# Patient Record
Sex: FEMALE | URBAN - METROPOLITAN AREA
[De-identification: names, ages, dates, MRNs, and addresses within clinical notes are randomized per-mention and may not be internally consistent; named-entity substitution may affect disease eponyms.]

---

## 2022-06-01 ENCOUNTER — HOSPITAL ENCOUNTER (OUTPATIENT)
Dept: RADIOLOGY | Facility: MEDICAL CENTER | Age: 47
End: 2022-06-01

## 2022-06-01 ENCOUNTER — OFFICE VISIT (OUTPATIENT)
Dept: NEUROLOGY | Facility: MEDICAL CENTER | Age: 47
End: 2022-06-01
Attending: PSYCHIATRY & NEUROLOGY
Payer: COMMERCIAL

## 2022-06-01 VITALS
TEMPERATURE: 98.4 F | SYSTOLIC BLOOD PRESSURE: 124 MMHG | HEIGHT: 66 IN | DIASTOLIC BLOOD PRESSURE: 72 MMHG | RESPIRATION RATE: 14 BRPM | WEIGHT: 160.27 LBS | OXYGEN SATURATION: 99 % | BODY MASS INDEX: 25.76 KG/M2 | HEART RATE: 103 BPM

## 2022-06-01 DIAGNOSIS — R20.2 PARESTHESIA: ICD-10-CM

## 2022-06-01 DIAGNOSIS — M79.10 MUSCLE PAIN: ICD-10-CM

## 2022-06-01 PROCEDURE — 99204 OFFICE O/P NEW MOD 45 MIN: CPT | Performed by: PSYCHIATRY & NEUROLOGY

## 2022-06-01 RX ORDER — CYCLOBENZAPRINE HCL 10 MG
TABLET ORAL
COMMUNITY
Start: 2022-05-18

## 2022-06-01 RX ORDER — GABAPENTIN 300 MG/1
CAPSULE ORAL
COMMUNITY
Start: 2022-05-12

## 2022-06-01 RX ORDER — CELECOXIB 200 MG/1
200 CAPSULE ORAL
COMMUNITY
Start: 2022-05-12

## 2022-06-01 RX ORDER — ZOLPIDEM TARTRATE 10 MG/1
TABLET ORAL
COMMUNITY
Start: 2022-05-27

## 2022-06-01 ASSESSMENT — PATIENT HEALTH QUESTIONNAIRE - PHQ9
CLINICAL INTERPRETATION OF PHQ2 SCORE: 6
SUM OF ALL RESPONSES TO PHQ QUESTIONS 1-9: 23
5. POOR APPETITE OR OVEREATING: 3 - NEARLY EVERY DAY

## 2022-06-01 NOTE — PROGRESS NOTES
"Chief Complaint   Patient presents with   • Muscle Pain     Est care       History of present illness:  Ambika Zelaya 47 y.o. female with pain and numbness in her lower extremities, and her spine there is a buzzing sensation.  Per referral notes, this is a migratory phenomenon.  Her primary doctor imaged the entire neuraxis, the patient had a normal MRI of the brain, cervical, thoracic, and lumbar spine.    Since Dec 2021, she has had occasional pain in the right shoulder shooting down her right arm down the right back. When her neck is flexed, this may recur. She worked with physical therapy for treatment but felt that this worsened despite PT.   One day after skiing in April, she felt that she worsened and developed rigidity down her entire right side. She has worked with chiropractic and acupuncture for treatment. She feels that her spine is \"burning\".     She would wake up with various parts of her body feeling numb. If she sat down, her legs would fall asleep. There was an energy surge in her torso. In the last 5 days, this is improved after she has done massage and physical therapy.     Past medical history:   No past medical history on file.    Past surgical history:   No past surgical history on file.    Family history:   No family history on file.    Social history:   Social History     Socioeconomic History   • Marital status: Not on file     Spouse name: Not on file   • Number of children: Not on file   • Years of education: Not on file   • Highest education level: Bachelor's degree (e.g., BA, AB, BS)   Occupational History   • Not on file   Tobacco Use   • Smoking status: Former Smoker   • Smokeless tobacco: Never Used   Substance and Sexual Activity   • Alcohol use: Not on file   • Drug use: Not on file   • Sexual activity: Not on file   Other Topics Concern   • Not on file   Social History Narrative   • Not on file     Social Determinants of Health     Financial Resource Strain: Low Risk    • " "Difficulty of Paying Living Expenses: Not hard at all   Food Insecurity: No Food Insecurity   • Worried About Running Out of Food in the Last Year: Never true   • Ran Out of Food in the Last Year: Never true   Transportation Needs: No Transportation Needs   • Lack of Transportation (Medical): No   • Lack of Transportation (Non-Medical): No   Physical Activity: Sufficiently Active   • Days of Exercise per Week: 5 days   • Minutes of Exercise per Session: 60 min   Stress: Stress Concern Present   • Feeling of Stress : To some extent   Social Connections: Unknown   • Frequency of Communication with Friends and Family: More than three times a week   • Frequency of Social Gatherings with Friends and Family: Three times a week   • Attends Alevism Services: Not on file   • Active Member of Clubs or Organizations: Yes   • Attends Club or Organization Meetings: More than 4 times per year   • Marital Status: Never    Intimate Partner Violence: Not on file   Housing Stability: High Risk   • Unable to Pay for Housing in the Last Year: No   • Number of Places Lived in the Last Year: 7   • Unstable Housing in the Last Year: No       Current medications:   Current Outpatient Medications   Medication   • celecoxib (CELEBREX) 200 MG Cap   • cyclobenzaprine (FLEXERIL) 10 mg Tab   • gabapentin (NEURONTIN) 300 MG Cap   • zolpidem (AMBIEN) 10 MG Tab     No current facility-administered medications for this visit.       Medication Allergy:  No Known Allergies    Physical examination:   Vitals:    06/01/22 0749   BP: 124/72   BP Location: Left arm   Patient Position: Sitting   BP Cuff Size: Adult   Pulse: (!) 103   Resp: 14   Temp: 36.9 °C (98.4 °F)   TempSrc: Temporal   SpO2: 99%   Weight: 72.7 kg (160 lb 4.4 oz)   Height: 1.676 m (5' 6\")     Neurological Exam  Mental Status  Awake and alert. Speech is normal. Language is fluent with no aphasia.  The patient is hyper active, and is hypomanic, with pressured speech and constant " movement. .    Motor   Normal muscle tone. No abnormal involuntary movements.                                             Right                     Left   Shoulder abduction               5                          5  Elbow flexion                         5                          5  Elbow extension                    5                          5  Wrist extension                      5                          5  Finger flexion                         5                          5  Hip abduction                         5                          5  Hip adduction                         5                          5  Knee flexion                           5                          5  Knee extension                                              5  Plantarflexion                         5                          5  Dorsiflexion                            5                          5    Reflexes                                            Right                      Left  Brachioradialis                    2+                         2+  Biceps                                 2+                         2+  Triceps                                2+                         2+  Patellar                                2+                         2+  Achilles                                0                         0    Coordination  Right: Finger-to-nose normal. Rapid alternating movement normal. Heel-to-shin normal.Left: Finger-to-nose normal. Rapid alternating movement normal. Heel-to-shin normal.    Gait  Casual gait: Antalgic gait.      Labs:  I reviewed the following labs personally:  B12: 501  TSH: 1.66  CRP: 1.5  CK: 70    Imaging:   MRI brain:  Impression      There is no acute parenchymal deformity or enhancing lesion of brain.       MRI cervical spine:  1. Small broad-based disc bulge, with a small focal central disc protrusion at C4-5, without central   canal stenosis; disc osteophyte complexes resulting in mild  bilateral neural foramen narrowing.   2. Small-moderate broad-based disc bulges at C5-6 and C6-7, resulting in mild central canal stenosis;   disc osteophyte complexes, resulting in varying degrees of mild and mild-moderate neural foramina   narrowing, please see details above.   3. Grade 1 retrolisthesis of C4 on C5 and C5 on C6.   4. No evidence of acute fracture.     MRI thoracic spine:  Impression      1. Unremarkable examination.     MRI LUMBAR WO CONTRAST  Order: [id number]    Impression        1. Small posterior broad-based disc protrusion at L5-S1, with associated disc desiccation and small   posterior annular tear/fissure, without evidence of central canal stenosis or neural foramen narrowing.   2. Mild facet arthrosis at L4-5 and L5-S1.       ASSESSMENT AND PLAN:  Problem List Items Addressed This Visit     Muscle pain    Paresthesia          1. Muscle pain    2. Paresthesia    Other orders  - celecoxib (CELEBREX) 200 MG Cap; Take 200 mg by mouth.  - cyclobenzaprine (FLEXERIL) 10 mg Tab; Take 1 Tablet (10 mg) by mouth 3 times daily as needed for Spasm.  - gabapentin (NEURONTIN) 300 MG Cap; Take 1 Capsule (300 mg) by mouth 3 times daily.  - zolpidem (AMBIEN) 10 MG Tab; TAKE ONE TABLET BY MOUTH NIGHTLY AT BEDTIME AS NEEDED FOR INSOMNIA    47-year-old female with multiple sensory symptoms, including diffuse muscle tightness and paresthesias in her entire body.  I have performed a neurological examination and have reviewed the reports for her MRI scans.  There is no evidence of a neurologic disorder.      FOLLOW-UP:   No follow-ups on file.    Total time spent for the day for this patient unrelated to procedure time is: 41 minutes. I spent 29 minutes in face to face time and I spent 6 minutes pre-charting and 6 minutes in post-visit documentation.      Dr. Jayson Sharp D.O.  [facility]

## 2022-08-07 ENCOUNTER — TELEPHONE (OUTPATIENT)
Dept: NEUROLOGY | Facility: MEDICAL CENTER | Age: 47
End: 2022-08-07
Payer: COMMERCIAL

## 2022-08-08 NOTE — TELEPHONE ENCOUNTER
----- Message from Petrona Moore sent at 8/5/2022 10:09 AM PDT -----  Regarding: Pt requested chng in [facility] in July via [employer] [facility] from [facility] - refuses to see Dr. Sharp again  Pt was very upset stated she did not like method or info at new patient visit. Wants to see someone else. Know our appointment situation, pt has already tried elsewhere in town. I explained it had to be approved and would be out quite some time if it was approved but may not be possible due to limited appointment availability. Please advise.

## 2022-08-08 NOTE — TELEPHONE ENCOUNTER
I had the opportunity to review Dr. Sharp's notes when he saw this patient on June 1, 2022.  I agree with his assessment and recommendations.  I cannot add anything different that might provide help.  There is no reason for me to see this patient.

## 2025-03-04 NOTE — TELEPHONE ENCOUNTER
I reviewed Dr. Sharp's note. I don't think I can add much more to the assessment.    Detail Level: Generalized Detail Level: Zone

## 2025-05-14 ENCOUNTER — APPOINTMENT (OUTPATIENT)
Dept: URBAN - NONMETROPOLITAN AREA CLINIC 1 | Facility: CLINIC | Age: 50
Setting detail: DERMATOLOGY
End: 2025-05-14

## 2025-05-14 DIAGNOSIS — Z12.83 ENCOUNTER FOR SCREENING FOR MALIGNANT NEOPLASM OF SKIN: ICD-10-CM

## 2025-05-14 DIAGNOSIS — L57.8 OTHER SKIN CHANGES DUE TO CHRONIC EXPOSURE TO NONIONIZING RADIATION: ICD-10-CM

## 2025-05-14 DIAGNOSIS — D18.0 HEMANGIOMA: ICD-10-CM

## 2025-05-14 DIAGNOSIS — Z80.8 FAMILY HISTORY OF MALIGNANT NEOPLASM OF OTHER ORGANS OR SYSTEMS: ICD-10-CM

## 2025-05-14 PROBLEM — D18.01 HEMANGIOMA OF SKIN AND SUBCUTANEOUS TISSUE: Status: ACTIVE | Noted: 2025-05-14

## 2025-05-14 PROBLEM — D23.72 OTHER BENIGN NEOPLASM OF SKIN OF LEFT LOWER LIMB, INCLUDING HIP: Status: ACTIVE | Noted: 2025-05-14

## 2025-05-14 PROCEDURE — 99203 OFFICE O/P NEW LOW 30 MIN: CPT

## 2025-05-14 PROCEDURE — ? COUNSELING

## 2025-05-14 ASSESSMENT — LOCATION DETAILED DESCRIPTION DERM
LOCATION DETAILED: RIGHT SUPERIOR MEDIAL UPPER BACK
LOCATION DETAILED: MIDDLE STERNUM

## 2025-05-14 ASSESSMENT — LOCATION SIMPLE DESCRIPTION DERM
LOCATION SIMPLE: RIGHT UPPER BACK
LOCATION SIMPLE: CHEST

## 2025-05-14 ASSESSMENT — LOCATION ZONE DERM: LOCATION ZONE: TRUNK
